# Patient Record
Sex: FEMALE | URBAN - METROPOLITAN AREA
[De-identification: names, ages, dates, MRNs, and addresses within clinical notes are randomized per-mention and may not be internally consistent; named-entity substitution may affect disease eponyms.]

---

## 2020-07-25 ENCOUNTER — TELEPHONE ENCOUNTER (OUTPATIENT)
Dept: URBAN - METROPOLITAN AREA CLINIC 13 | Facility: CLINIC | Age: 70
End: 2020-07-25

## 2020-07-26 ENCOUNTER — TELEPHONE ENCOUNTER (OUTPATIENT)
Dept: URBAN - METROPOLITAN AREA CLINIC 13 | Facility: CLINIC | Age: 70
End: 2020-07-26

## 2021-01-05 ENCOUNTER — HOSPITAL ENCOUNTER (OUTPATIENT)
Age: 71
Discharge: HOME | End: 2021-01-07
Payer: MEDICARE

## 2021-01-05 ENCOUNTER — HOSPITAL ENCOUNTER (OUTPATIENT)
Dept: INTERVENTIONAL RADIOLOGY/VASCULAR | Age: 71
Discharge: HOME | End: 2021-01-07
Payer: MEDICARE

## 2021-01-05 DIAGNOSIS — Z01.818 PREOP TESTING: ICD-10-CM

## 2021-01-05 PROCEDURE — U0003 INFECTIOUS AGENT DETECTION BY NUCLEIC ACID (DNA OR RNA); SEVERE ACUTE RESPIRATORY SYNDROME CORONAVIRUS 2 (SARS-COV-2) (CORONAVIRUS DISEASE [COVID-19]), AMPLIFIED PROBE TECHNIQUE, MAKING USE OF HIGH THROUGHPUT TECHNOLOGIES AS DESCRIBED BY CMS-2020-01-R: HCPCS

## 2021-01-05 PROCEDURE — 93922 UPR/L XTREMITY ART 2 LEVELS: CPT

## 2021-01-05 NOTE — PROGRESS NOTES
Have you been tested for COVID  Yes           Have you been told you were positive for COVID No  Have you had any known exposure to someone that is positive for COVID No  Do you have a cough                   No              Do you have shortness of breath No                 Do you have a sore throat            No                Are you having chills                    No                Are you having muscle aches. No                    Please come to the hospital wearing a mask and have your significant other wear a mask as well. Both of you should check your temperature before leaving to come here,  if it is 100 or higher please call [phone] for instruction. [facility] PRE-ADMISSION TESTING INSTRUCTIONS    The Preadmission Testing patient is instructed accordingly using the following criteria (check applicable):    ARRIVAL INSTRUCTIONS:  [x] Parking the day of Surgery is located in the Main Entrance lot. Upon entering the door, make an immediate right to the surgery reception desk    [x] Bring photo ID and insurance card    [] Bring in a copy of Living will or Durable Power of  papers.     [x] Please be sure to arrange for responsible adult to provide transportation to and from the hospital    [x] Please arrange for responsible adult to be with you for the 24 hour period post procedure due to having anesthesia      GENERAL INSTRUCTIONS:    [x] Nothing by mouth after midnight, including gum, candy, mints or water    [x] You may brush your teeth, but do not swallow any water    [] Take medications as instructed with 1-2 oz of water    [x] Stop herbal supplements and vitamins 5 days prior to procedure    [] Follow preop dosing of blood thinners per physician instructions    [] Take 1/2 dose of evening insulin, but no insulin after midnight    [x] No oral diabetic medications after midnight [x] If diabetic and have low blood sugar or feel symptomatic, take 1-2oz apple juice only    [] Bring inhalers day of surgery    [] Bring C-PAP/ Bi-Pap day of surgery    [] Bring urine specimen day of surgery    [x] Shower or bath with soap, lather and rinse well, AM of Surgery, no lotion, powders or creams to surgical site    [] Follow bowel prep as instructed per surgeon    [x] No tobacco products within 24 hours of surgery     [x] No alcohol or illegal drug use within 24 hours of surgery.     [x] Jewelry, body piercing's, eyeglasses, contact lenses and dentures are not permitted into surgery (bring cases)      [x] Please do not wear any nail polish, make up or hair products on the day of surgery    [x] You can expect a call the business day prior to procedure to notify you if your arrival time changes    [x] If you receive a survey after surgery we would greatly appreciate your comments    [] Parent/guardian of a minor must accompany their child and remain on the premises  the entire time they are under our care     [] Pediatric patients may bring favorite toy, blanket or comfort item with them    [] A caregiver or family member must remain with the patient during their stay if they are mentally handicapped, have dementia, disoriented or unable to use a call light or would be a safety concern if left unattended    [x] Please notify surgeon if you develop any illness between now and time of surgery (cold, cough, sore throat, fever, nausea, vomiting) or any signs of infections  including skin, wounds, and dental.    [x]  The Outpatient Pharmacy is available to fill your prescription here on your day of surgery, ask your preop nurse for details    [x] Other instructions: Wear comfortable clothing    EDUCATIONAL MATERIALS PROVIDED:    [] PAT Preoperative Education Packet/Booklet     [] Medication List    [] Transfusion bracelet applied with instructions [] Shower with soap, lather and rinse well, and use CHG wipes provided the evening before surgery as instructed    [] Incentive spirometer with instructions

## 2021-01-07 LAB
SARS-COV-2: NOT DETECTED
SOURCE: NORMAL

## 2021-01-10 ENCOUNTER — ANESTHESIA EVENT (OUTPATIENT)
Dept: OPERATING ROOM | Age: 71
End: 2021-01-10
Payer: MEDICARE

## 2021-01-10 NOTE — ANESTHESIA PRE PROCEDURE
Department of Anesthesiology  Preprocedure Note       Name:  Enrico Toth   Age:  65 y.o.  :  1950                                          MRN:  [id number]         Date:  1/10/2021      Surgeon: Chad Wells):  Nicolas Garcia DPM    Procedure: Procedure(s):  HAMMER TOE CORRECTION 2 AND 3 ON THE LEFT LAPIDUS BUNIONECTOMY MIDFOOT FUSION  ENDOSCOPIC GASTROCNEMIUS RECESSION  HARVEST BONE GRAFT   +++IN 2 BONES+++    Medications prior to admission:   Prior to Admission medications    Medication Sig Start Date End Date Taking? Authorizing Provider   losartan (COZAAR) 100 MG tablet Take 100 mg by mouth daily   Yes Historical Provider, MD   metFORMIN (GLUCOPHAGE) 500 MG tablet Take 500 mg by mouth daily (with breakfast)   Yes Historical Provider, MD   montelukast (SINGULAIR) 5 MG chewable tablet Take 5 mg by mouth nightly   Yes Historical Provider, MD   DULoxetine (CYMBALTA) 60 MG extended release capsule Take 60 mg by mouth nightly   Yes Historical Provider, MD   Ergocalciferol (VITAMIN D2 PO) Take by mouth Twice a Week    Historical Provider, MD   Omega-3 Fatty Acids (OMEGA 3 PO) Take by mouth Instructed to hold 5 days pre-op    Historical Provider, MD   BIOTIN PO Take by mouth daily Instructed to hold 5 days pre-op    Historical Provider, MD       Current medications:    No current facility-administered medications for this encounter.       Current Outpatient Medications   Medication Sig Dispense Refill    losartan (COZAAR) 100 MG tablet Take 100 mg by mouth daily      metFORMIN (GLUCOPHAGE) 500 MG tablet Take 500 mg by mouth daily (with breakfast)      montelukast (SINGULAIR) 5 MG chewable tablet Take 5 mg by mouth nightly      DULoxetine (CYMBALTA) 60 MG extended release capsule Take 60 mg by mouth nightly      Ergocalciferol (VITAMIN D2 PO) Take by mouth Twice a Week      Omega-3 Fatty Acids (OMEGA 3 PO) Take by mouth Instructed to hold 5 days pre-op      BIOTIN PO Take by mouth daily Instructed to Evaluation  Patient summary reviewed no history of anesthetic complications:   Airway: Mallampati: III  TM distance: >3 FB   Neck ROM: full  Mouth opening: > = 3 FB Dental:          Pulmonary:Negative Pulmonary ROS breath sounds clear to auscultation  (+) sleep apnea: on CPAP,      (-) not a current smoker                           Cardiovascular:    (+) hypertension:,       ECG reviewed  Rhythm: regular  Rate: normal                    Neuro/Psych:   (+) neuromuscular disease (Fibromyalgia):,              ROS comment: S/P back surgery GI/Hepatic/Renal: Neg GI/Hepatic/Renal ROS            Endo/Other:    (+) DiabetesType II DM, , .                 Abdominal:           Vascular: negative vascular ROS. Anesthesia Plan      general     ASA 3     (Pt agrees to GA--IV induction and ETTube.)  Induction: intravenous. Anesthetic plan and risks discussed with patient. Plan discussed with CRNA. Attending anesthesiologist reviewed and agrees with Pre Eval content      DOS STAFF ADDENDUM:    Pt seen and examined, physical exam updated, chart reviewed including anesthesia, drug and allergy history. H&P reviewed. No interval changes to history or physical examination (unless noted above). NPO status confirmed. Anesthetic plan, risks, benefits, alternatives discussed with patient. Patient verbalized an understanding and agrees to proceed.      Jesenia Iqbal MD  Staff Anesthesiologist  7:31 AM          Jesenia Iqbal MD   1/10/2021

## 2021-01-11 ENCOUNTER — HOSPITAL ENCOUNTER (OUTPATIENT)
Age: 71
Setting detail: OUTPATIENT SURGERY
Discharge: HOME | End: 2021-01-11
Attending: PODIATRIST | Admitting: PODIATRIST
Payer: MEDICARE

## 2021-01-11 ENCOUNTER — ANESTHESIA (OUTPATIENT)
Dept: OPERATING ROOM | Age: 71
End: 2021-01-11
Payer: MEDICARE

## 2021-01-11 ENCOUNTER — APPOINTMENT (OUTPATIENT)
Dept: GENERAL RADIOLOGY | Age: 71
End: 2021-01-11
Attending: PODIATRIST
Payer: MEDICARE

## 2021-01-11 VITALS
BODY MASS INDEX: 39.65 KG/M2 | DIASTOLIC BLOOD PRESSURE: 77 MMHG | OXYGEN SATURATION: 93 % | HEART RATE: 92 BPM | SYSTOLIC BLOOD PRESSURE: 167 MMHG | WEIGHT: 210 LBS | HEIGHT: 61 IN | RESPIRATION RATE: 20 BRPM | TEMPERATURE: 97.3 F

## 2021-01-11 VITALS — TEMPERATURE: 97.6 F | DIASTOLIC BLOOD PRESSURE: 83 MMHG | OXYGEN SATURATION: 99 % | SYSTOLIC BLOOD PRESSURE: 163 MMHG

## 2021-01-11 DIAGNOSIS — G89.18 POST-OP PAIN: ICD-10-CM

## 2021-01-11 DIAGNOSIS — Z01.818 PREOP TESTING: Primary | ICD-10-CM

## 2021-01-11 LAB
METER GLUCOSE: 117 MG/DL (ref 74–99)
METER GLUCOSE: 130 MG/DL (ref 74–99)

## 2021-01-11 PROCEDURE — 87206 SMEAR FLUORESCENT/ACID STAI: CPT

## 2021-01-11 PROCEDURE — 2720000001 HC MISC SURG SUPPLY STERILE $51-500: Performed by: PODIATRIST

## 2021-01-11 PROCEDURE — 87075 CULTR BACTERIA EXCEPT BLOOD: CPT

## 2021-01-11 PROCEDURE — 87102 FUNGUS ISOLATION CULTURE: CPT

## 2021-01-11 PROCEDURE — 87015 SPECIMEN INFECT AGNT CONCNTJ: CPT

## 2021-01-11 PROCEDURE — 2709999900 HC NON-CHARGEABLE SUPPLY: Performed by: PODIATRIST

## 2021-01-11 PROCEDURE — 6370000000 HC RX 637 (ALT 250 FOR IP): Performed by: ANESTHESIOLOGY

## 2021-01-11 PROCEDURE — 7100000011 HC PHASE II RECOVERY - ADDTL 15 MIN: Performed by: PODIATRIST

## 2021-01-11 PROCEDURE — 6360000002 HC RX W HCPCS: Performed by: STUDENT IN AN ORGANIZED HEALTH CARE EDUCATION/TRAINING PROGRAM

## 2021-01-11 PROCEDURE — 3700000001 HC ADD 15 MINUTES (ANESTHESIA): Performed by: PODIATRIST

## 2021-01-11 PROCEDURE — 88311 DECALCIFY TISSUE: CPT

## 2021-01-11 PROCEDURE — 3700000000 HC ANESTHESIA ATTENDED CARE: Performed by: PODIATRIST

## 2021-01-11 PROCEDURE — 2500000003 HC RX 250 WO HCPCS: Performed by: PODIATRIST

## 2021-01-11 PROCEDURE — 2720000010 HC SURG SUPPLY STERILE: Performed by: PODIATRIST

## 2021-01-11 PROCEDURE — 2580000003 HC RX 258: Performed by: NURSE ANESTHETIST, CERTIFIED REGISTERED

## 2021-01-11 PROCEDURE — 87116 MYCOBACTERIA CULTURE: CPT

## 2021-01-11 PROCEDURE — 6360000002 HC RX W HCPCS: Performed by: ANESTHESIOLOGY

## 2021-01-11 PROCEDURE — 6360000002 HC RX W HCPCS: Performed by: NURSE ANESTHETIST, CERTIFIED REGISTERED

## 2021-01-11 PROCEDURE — 87205 SMEAR GRAM STAIN: CPT

## 2021-01-11 PROCEDURE — 3209999900 FLUORO FOR SURGICAL PROCEDURES

## 2021-01-11 PROCEDURE — 7100000010 HC PHASE II RECOVERY - FIRST 15 MIN: Performed by: PODIATRIST

## 2021-01-11 PROCEDURE — 2500000003 HC RX 250 WO HCPCS: Performed by: ANESTHESIOLOGY

## 2021-01-11 PROCEDURE — 88304 TISSUE EXAM BY PATHOLOGIST: CPT

## 2021-01-11 PROCEDURE — 3600000003 HC SURGERY LEVEL 3 BASE: Performed by: PODIATRIST

## 2021-01-11 PROCEDURE — 2500000003 HC RX 250 WO HCPCS: Performed by: NURSE ANESTHETIST, CERTIFIED REGISTERED

## 2021-01-11 PROCEDURE — 7100000001 HC PACU RECOVERY - ADDTL 15 MIN: Performed by: PODIATRIST

## 2021-01-11 PROCEDURE — 82962 GLUCOSE BLOOD TEST: CPT

## 2021-01-11 PROCEDURE — 3600000013 HC SURGERY LEVEL 3 ADDTL 15MIN: Performed by: PODIATRIST

## 2021-01-11 PROCEDURE — 87070 CULTURE OTHR SPECIMN AEROBIC: CPT

## 2021-01-11 PROCEDURE — C1713 ANCHOR/SCREW BN/BN,TIS/BN: HCPCS | Performed by: PODIATRIST

## 2021-01-11 PROCEDURE — 7100000000 HC PACU RECOVERY - FIRST 15 MIN: Performed by: PODIATRIST

## 2021-01-11 DEVICE — IMPLANTABLE DEVICE: Type: IMPLANTABLE DEVICE | Site: FOOT | Status: FUNCTIONAL

## 2021-01-11 RX ORDER — DEXAMETHASONE SODIUM PHOSPHATE 4 MG/ML
INJECTION, SOLUTION INTRA-ARTICULAR; INTRALESIONAL; INTRAMUSCULAR; INTRAVENOUS; SOFT TISSUE PRN
Status: DISCONTINUED | OUTPATIENT
Start: 2021-01-11 | End: 2021-01-11 | Stop reason: SDUPTHER

## 2021-01-11 RX ORDER — GLYCOPYRROLATE 1 MG/5 ML
SYRINGE (ML) INTRAVENOUS PRN
Status: DISCONTINUED | OUTPATIENT
Start: 2021-01-11 | End: 2021-01-11 | Stop reason: SDUPTHER

## 2021-01-11 RX ORDER — MIDAZOLAM HYDROCHLORIDE 1 MG/ML
INJECTION INTRAMUSCULAR; INTRAVENOUS PRN
Status: DISCONTINUED | OUTPATIENT
Start: 2021-01-11 | End: 2021-01-11 | Stop reason: SDUPTHER

## 2021-01-11 RX ORDER — RIVAROXABAN 10 MG/1
10 TABLET, FILM COATED ORAL
Qty: 30 TABLET | Refills: 1 | Status: SHIPPED | OUTPATIENT
Start: 2021-01-11 | End: 2021-06-30 | Stop reason: ALTCHOICE

## 2021-01-11 RX ORDER — LIDOCAINE HYDROCHLORIDE 20 MG/ML
INJECTION, SOLUTION EPIDURAL; INFILTRATION; INTRACAUDAL; PERINEURAL PRN
Status: DISCONTINUED | OUTPATIENT
Start: 2021-01-11 | End: 2021-01-11 | Stop reason: SDUPTHER

## 2021-01-11 RX ORDER — LABETALOL HYDROCHLORIDE 5 MG/ML
INJECTION, SOLUTION INTRAVENOUS PRN
Status: DISCONTINUED | OUTPATIENT
Start: 2021-01-11 | End: 2021-01-11 | Stop reason: SDUPTHER

## 2021-01-11 RX ORDER — PROPOFOL 10 MG/ML
INJECTION, EMULSION INTRAVENOUS PRN
Status: DISCONTINUED | OUTPATIENT
Start: 2021-01-11 | End: 2021-01-11 | Stop reason: SDUPTHER

## 2021-01-11 RX ORDER — FENTANYL CITRATE 50 UG/ML
INJECTION, SOLUTION INTRAMUSCULAR; INTRAVENOUS PRN
Status: DISCONTINUED | OUTPATIENT
Start: 2021-01-11 | End: 2021-01-11 | Stop reason: SDUPTHER

## 2021-01-11 RX ORDER — FENTANYL CITRATE 50 UG/ML
25 INJECTION, SOLUTION INTRAMUSCULAR; INTRAVENOUS EVERY 5 MIN PRN
Status: DISCONTINUED | OUTPATIENT
Start: 2021-01-11 | End: 2021-01-11 | Stop reason: HOSPADM

## 2021-01-11 RX ORDER — BUPIVACAINE HYDROCHLORIDE 5 MG/ML
INJECTION, SOLUTION EPIDURAL; INTRACAUDAL PRN
Status: DISCONTINUED | OUTPATIENT
Start: 2021-01-11 | End: 2021-01-11 | Stop reason: ALTCHOICE

## 2021-01-11 RX ORDER — TRAMADOL HYDROCHLORIDE 50 MG/1
50 TABLET ORAL
Status: COMPLETED | OUTPATIENT
Start: 2021-01-11 | End: 2021-01-11

## 2021-01-11 RX ORDER — KETOROLAC TROMETHAMINE 30 MG/ML
INJECTION, SOLUTION INTRAMUSCULAR; INTRAVENOUS PRN
Status: DISCONTINUED | OUTPATIENT
Start: 2021-01-11 | End: 2021-01-11 | Stop reason: SDUPTHER

## 2021-01-11 RX ORDER — LABETALOL HYDROCHLORIDE 5 MG/ML
5 INJECTION, SOLUTION INTRAVENOUS ONCE
Status: COMPLETED | OUTPATIENT
Start: 2021-01-11 | End: 2021-01-11

## 2021-01-11 RX ORDER — NEOSTIGMINE METHYLSULFATE 1 MG/ML
INJECTION, SOLUTION INTRAVENOUS PRN
Status: DISCONTINUED | OUTPATIENT
Start: 2021-01-11 | End: 2021-01-11 | Stop reason: SDUPTHER

## 2021-01-11 RX ORDER — TRAMADOL HYDROCHLORIDE 50 MG/1
50 TABLET ORAL EVERY 6 HOURS PRN
Qty: 28 TABLET | Refills: 0 | Status: SHIPPED | OUTPATIENT
Start: 2021-01-11 | End: 2021-01-18

## 2021-01-11 RX ORDER — SODIUM CHLORIDE 9 MG/ML
INJECTION, SOLUTION INTRAVENOUS CONTINUOUS PRN
Status: DISCONTINUED | OUTPATIENT
Start: 2021-01-11 | End: 2021-01-11 | Stop reason: SDUPTHER

## 2021-01-11 RX ORDER — DOXYCYCLINE HYCLATE 100 MG/1
100 CAPSULE ORAL 2 TIMES DAILY
Qty: 20 CAPSULE | Refills: 0 | Status: SHIPPED | OUTPATIENT
Start: 2021-01-11 | End: 2021-01-21

## 2021-01-11 RX ORDER — ROCURONIUM BROMIDE 10 MG/ML
INJECTION, SOLUTION INTRAVENOUS PRN
Status: DISCONTINUED | OUTPATIENT
Start: 2021-01-11 | End: 2021-01-11 | Stop reason: SDUPTHER

## 2021-01-11 RX ORDER — ONDANSETRON 2 MG/ML
INJECTION INTRAMUSCULAR; INTRAVENOUS PRN
Status: DISCONTINUED | OUTPATIENT
Start: 2021-01-11 | End: 2021-01-11 | Stop reason: SDUPTHER

## 2021-01-11 RX ADMIN — LABETALOL HYDROCHLORIDE 2.5 MG: 5 INJECTION INTRAVENOUS at 08:07

## 2021-01-11 RX ADMIN — KETOROLAC TROMETHAMINE 30 MG: 30 INJECTION, SOLUTION INTRAMUSCULAR; INTRAVENOUS at 10:05

## 2021-01-11 RX ADMIN — PROPOFOL 150 MG: 10 INJECTION, EMULSION INTRAVENOUS at 07:42

## 2021-01-11 RX ADMIN — LIDOCAINE HYDROCHLORIDE 60 MG: 20 INJECTION, SOLUTION EPIDURAL; INFILTRATION; INTRACAUDAL; PERINEURAL at 07:42

## 2021-01-11 RX ADMIN — LABETALOL HYDROCHLORIDE 5 MG: 5 INJECTION INTRAVENOUS at 08:59

## 2021-01-11 RX ADMIN — FENTANYL CITRATE 50 MCG: 50 INJECTION, SOLUTION INTRAMUSCULAR; INTRAVENOUS at 07:33

## 2021-01-11 RX ADMIN — ROCURONIUM BROMIDE 10 MG: 10 INJECTION, SOLUTION INTRAVENOUS at 09:29

## 2021-01-11 RX ADMIN — Medication 2 G: at 07:31

## 2021-01-11 RX ADMIN — PROPOFOL 50 MG: 10 INJECTION, EMULSION INTRAVENOUS at 07:47

## 2021-01-11 RX ADMIN — SODIUM CHLORIDE: 9 INJECTION, SOLUTION INTRAVENOUS at 07:30

## 2021-01-11 RX ADMIN — FENTANYL CITRATE 25 MCG: 50 INJECTION, SOLUTION INTRAMUSCULAR; INTRAVENOUS at 11:20

## 2021-01-11 RX ADMIN — Medication 3 MG: at 10:16

## 2021-01-11 RX ADMIN — TRAMADOL HYDROCHLORIDE 50 MG: 50 TABLET, FILM COATED ORAL at 12:52

## 2021-01-11 RX ADMIN — FENTANYL CITRATE 25 MCG: 50 INJECTION, SOLUTION INTRAMUSCULAR; INTRAVENOUS at 11:25

## 2021-01-11 RX ADMIN — FENTANYL CITRATE 50 MCG: 50 INJECTION, SOLUTION INTRAMUSCULAR; INTRAVENOUS at 07:53

## 2021-01-11 RX ADMIN — ROCURONIUM BROMIDE 10 MG: 10 INJECTION, SOLUTION INTRAVENOUS at 08:01

## 2021-01-11 RX ADMIN — MIDAZOLAM 1 MG: 1 INJECTION INTRAMUSCULAR; INTRAVENOUS at 07:36

## 2021-01-11 RX ADMIN — LABETALOL HYDROCHLORIDE 2.5 MG: 5 INJECTION INTRAVENOUS at 09:46

## 2021-01-11 RX ADMIN — LABETALOL HYDROCHLORIDE 5 MG: 5 INJECTION INTRAVENOUS at 08:21

## 2021-01-11 RX ADMIN — FENTANYL CITRATE 100 MCG: 50 INJECTION, SOLUTION INTRAMUSCULAR; INTRAVENOUS at 08:03

## 2021-01-11 RX ADMIN — SODIUM CHLORIDE: 9 INJECTION, SOLUTION INTRAVENOUS at 09:26

## 2021-01-11 RX ADMIN — MIDAZOLAM 1 MG: 1 INJECTION INTRAMUSCULAR; INTRAVENOUS at 07:32

## 2021-01-11 RX ADMIN — LABETALOL HYDROCHLORIDE 5 MG: 5 INJECTION INTRAVENOUS at 13:53

## 2021-01-11 RX ADMIN — DEXAMETHASONE SODIUM PHOSPHATE 10 MG: 4 INJECTION, SOLUTION INTRAMUSCULAR; INTRAVENOUS at 09:57

## 2021-01-11 RX ADMIN — ROCURONIUM BROMIDE 40 MG: 10 INJECTION, SOLUTION INTRAVENOUS at 07:42

## 2021-01-11 RX ADMIN — Medication 0.6 MG: at 10:14

## 2021-01-11 RX ADMIN — ONDANSETRON 4 MG: 2 INJECTION INTRAMUSCULAR; INTRAVENOUS at 10:07

## 2021-01-11 ASSESSMENT — PULMONARY FUNCTION TESTS
PIF_VALUE: 33
PIF_VALUE: 37
PIF_VALUE: 32
PIF_VALUE: 7
PIF_VALUE: 4
PIF_VALUE: 32
PIF_VALUE: 30
PIF_VALUE: 38
PIF_VALUE: 29
PIF_VALUE: 30
PIF_VALUE: 15
PIF_VALUE: 30
PIF_VALUE: 5
PIF_VALUE: 35
PIF_VALUE: 36
PIF_VALUE: 32
PIF_VALUE: 36
PIF_VALUE: 36
PIF_VALUE: 38
PIF_VALUE: 36
PIF_VALUE: 35
PIF_VALUE: 30
PIF_VALUE: 32
PIF_VALUE: 5
PIF_VALUE: 37
PIF_VALUE: 28
PIF_VALUE: 36
PIF_VALUE: 38
PIF_VALUE: 17
PIF_VALUE: 38
PIF_VALUE: 36
PIF_VALUE: 9
PIF_VALUE: 5
PIF_VALUE: 38
PIF_VALUE: 8
PIF_VALUE: 8
PIF_VALUE: 6
PIF_VALUE: 36
PIF_VALUE: 36
PIF_VALUE: 28
PIF_VALUE: 37
PIF_VALUE: 35
PIF_VALUE: 36
PIF_VALUE: 38
PIF_VALUE: 37
PIF_VALUE: 16
PIF_VALUE: 8
PIF_VALUE: 29
PIF_VALUE: 30
PIF_VALUE: 36
PIF_VALUE: 37
PIF_VALUE: 32
PIF_VALUE: 15
PIF_VALUE: 37
PIF_VALUE: 36
PIF_VALUE: 35
PIF_VALUE: 28
PIF_VALUE: 30
PIF_VALUE: 30
PIF_VALUE: 36
PIF_VALUE: 32
PIF_VALUE: 3
PIF_VALUE: 38
PIF_VALUE: 36
PIF_VALUE: 36
PIF_VALUE: 35
PIF_VALUE: 36
PIF_VALUE: 38
PIF_VALUE: 36
PIF_VALUE: 39
PIF_VALUE: 36
PIF_VALUE: 5
PIF_VALUE: 38
PIF_VALUE: 31
PIF_VALUE: 6
PIF_VALUE: 36
PIF_VALUE: 30
PIF_VALUE: 36
PIF_VALUE: 37
PIF_VALUE: 38
PIF_VALUE: 37
PIF_VALUE: 36

## 2021-01-11 ASSESSMENT — PAIN DESCRIPTION - PAIN TYPE
TYPE: SURGICAL PAIN

## 2021-01-11 ASSESSMENT — LIFESTYLE VARIABLES: SMOKING_STATUS: 0

## 2021-01-11 ASSESSMENT — PAIN DESCRIPTION - LOCATION
LOCATION: FOOT

## 2021-01-11 ASSESSMENT — PAIN DESCRIPTION - DESCRIPTORS
DESCRIPTORS: ACHING;DISCOMFORT
DESCRIPTORS: SORE
DESCRIPTORS: ACHING;SORE
DESCRIPTORS: ACHING;SORE
DESCRIPTORS: SORE

## 2021-01-11 ASSESSMENT — PAIN - FUNCTIONAL ASSESSMENT: PAIN_FUNCTIONAL_ASSESSMENT: 0-10

## 2021-01-11 ASSESSMENT — PAIN SCALES - GENERAL
PAINLEVEL_OUTOF10: 5
PAINLEVEL_OUTOF10: 5
PAINLEVEL_OUTOF10: 6
PAINLEVEL_OUTOF10: 8
PAINLEVEL_OUTOF10: 7
PAINLEVEL_OUTOF10: 5
PAINLEVEL_OUTOF10: 8

## 2021-01-11 ASSESSMENT — PAIN DESCRIPTION - ORIENTATION
ORIENTATION: LEFT

## 2021-01-11 NOTE — PROGRESS NOTES
Prior to discharge ortho boot reapplied, Rx given to patient for walker. When patient taken to POV, additional instructions given to daughter, regarding Rx's.   Daughter verbalizes understanding

## 2021-01-11 NOTE — ANESTHESIA POSTPROCEDURE EVALUATION
Department of Anesthesiology  Postprocedure Note    Patient: Joelle Smyth  MRN: [id number]  YOB: 1950  Date of evaluation: 1/11/2021  Time:  1:06 PM     Procedure Summary     Date: 01/11/21 Room / Location: [facility] OR [address] [place] [address]    Anesthesia Start: 0730 Anesthesia Stop:     Procedures:       LEFT LAPIDUS BUNIONECTOMY,  MIDFOOT FUSION, EXTENSOR TENDON TRANSFER SECOND AND THIRD, REDUCTION OF SECOND METATARSAL JOINT, FLEXOR TENDON TRANSFER 2 & 3, CAPSULOTOMY 2 & 3 LEFT FOOT (Left Foot)      ENDOSCOPIC GASTROCNEMIUS RECESSION,  HARVEST BONE GRAFT (Left Foot) Diagnosis: (HAMMER TOE PES VALGUS EQUINUS)    Surgeons: Carlito Marc DPM Responsible Provider: Anastasiya Grijalva MD    Anesthesia Type: general ASA Status: 3          Anesthesia Type: general    Fredo Phase I: Fredo Score: 10    Fredo Phase II: Fredo Score: 10    Last vitals: Reviewed and per EMR flowsheets.        Anesthesia Post Evaluation    Patient location during evaluation: PACU  Level of consciousness: awake  Airway patency: patent  Nausea & Vomiting: no nausea and no vomiting  Complications: no  Cardiovascular status: hemodynamically stable  Respiratory status: acceptable

## 2021-01-11 NOTE — PROGRESS NOTES
Pt on and off bedpan , given nourishment at this time  1255 pt medicated for pain see mar   1335 pt assisted on and off bedpan at this time tolerating po b/p elevated pt asking for more pain medication, explain to pt will chec with   [phone] spoke with dr Marianne Landin updated on above labetelol ordered see mar  1400 pt b/p slightly improved pt asking to go on bedpan at this time  1425 pt states pain improving other then worsens when moves , assisted off of bedpan at this time    handoff of care report given to che robles

## 2021-01-11 NOTE — BRIEF OP NOTE
COLINK AFX LOCKING SCREW, 3.5X16MM Screw/Plate/Nail/Dariel   [employer]-PMM [id number] Left 1 Implanted   COLINK AFX LOCKING SCREW, 3.5X20MM Screw/Plate/Nail/Dariel   [id number] [facility] [id number] Left 1 Implanted   K WIRE FIX L285MM DIA2MM S STL TRCR PNT  K WIRE FIX L285MM DIA2MM S STL TRCR PNT  [employer]-  Left 2 Implanted         Drains: * No LDAs found *    Findings: Good reduction of deformity and placement of hardware appreciated on x-ray. Toes pink with good cap refill time after correction and placement of k-wires.      Electronically signed by [facility] on 1/11/2021 at 10:49 AM

## 2021-01-12 LAB — GRAM STAIN ORDERABLE: NORMAL

## 2021-01-12 NOTE — OP NOTE
[address] [address]                                OPERATIVE REPORT    PATIENT NAME: Yvette Hodges                   :        1950  MED REC NO:   [id number]                            ROOM:  ACCOUNT NO:   [de-identified]                           ADMIT DATE: 2021  PROVIDER:     Migdalia Carlos DPM    DATE OF PROCEDURE:  2021    INDICATION NOTE:  The patient is a 75-year-old white female who is seen  for painful bunion, but more importantly a sub-second metatarsal pain,  left. The patient is aware of pros, cons, risks, benefits,  overcorrection, undercorrection, recurrence, numbness, infection,  nonunion, delayed union, malunion, DVT, PE, limb loss, anything can  happen, nothing should happen. With this in mind, she agreed with  consent, site marking, perioperative management. Her daughter has been  involved with the preoperative discussions and surgery. Again, they  understand the pros, cons, risks, and benefits. She agreed to site  marking, perioperative management, and consent. LOWER EXTREMITY PHYSICAL EXAMINATION:  VASCULAR:  Intact pedal pulses, 2/4 DP, PT bilaterally. Good capillary  refill time. NEUROLOGICAL:  Decreased epicritic sensation. DERMATOLOGICAL:  She has a sub-2 hyperkeratosis. She has inflamed  erythematous preulcer lesion, inflamed irritation of the PIPJ, second  toe, left and to a lesser extent, to the third toe as well. There is a  mildly inflamed erythematous first MTP of the left. MUSCULOSKELETAL:  She has a gastroc equinus, ankle joint contracture,  left. She has gross instability of her medial column. She has a  hypermobile first ray. She has instability between one and second  metatarsal in the midfoot of the right. She has a flexion contracture  at PIPJ 2, 3, left.   She has extensor contracture 2, 3 metatarsophalangeal joint, left. She has a subluxation and dislocation  of the second and third metatarsophalangeal joint, more so on second,  left. ORTHOPEDIC:  HAV deformity, contracted hammertoe deformities, 2, 3, left  with dislocation and subluxation of the second metatarsophalangeal joint  and extension of the digit on the second metatarsal and third  metatarsal, left foot. SURGEON:  Jimbo Lei DPM.    ASSISTANTS:  1.  Dr. Sebastien Foote, fellow. 2.  Sommer Ramirez, PGY-3.  [address], PGY-2.    PREOPERATIVE DIAGNOSES:  1. Gastroc equinus. 2.  Hallux valgus. 3.  Midfoot arthritis. 4.  Dislocation and subluxation of the second and third  metatarsophalangeal joint. 5.  Joint contracture and extension at the metatarsophalangeal joint 2.  6.  Joint contracture and extension at the metatarsophalangeal joint 3.  7.  Flexion contracture of 2 at the PIPJ. 8.  Flexion contracture at the PIPJ 3, all left. POSTOPERATIVE DIAGNOSES:  1. Gastroc equinus. 2.  Hallux valgus. 3.  Midfoot arthritis. 4.  Dislocation and subluxation of the second and third  metatarsophalangeal joint. 5.  Joint contracture and extension at the metatarsophalangeal joint 2.  6.  Joint contracture and extension at the metatarsophalangeal joint 3.  7.  Flexion contracture of 2 at the PIPJ. 8.  Flexion contracture at the PIPJ 3, all left. PROCEDURES PERFORMED:  1. Gastroc recession performed endoscopically. 2.  Lapidus bunionectomy. 3.  Midfoot fusion. 4.  Harvesting calcaneal graft. 5.  Reduction of second metatarsophalangeal joint dislocation. 6.  Capsulotomy of the second MTP.  7.  Capsulotomy of the third MTP. 8.  Tendon transfer, EDB to EDL 2.  9.  Tendon transfer, EDB to EDL 3.  10. Tendon transfer, FDL to the extensor nguyen 2.  11. Flexor transfer, FDL 3 to the extensor nguyen. PROCEDURE IN DETAIL:  The patient was seen in the preop holding area. Appropriate site marking was performed. She agreed to site marking,  perioperative management. She was brought to the OR and placed well  padded on the OR table, where anesthesia was achieved. Once the  anesthesia was achieved, appropriate timeout was performed and everybody  in the room concurred. PROCEDURE #1:  ENDOSCOPIC GASTROC RECESSION. Attention was directed to  the posterior aspect of the left lower leg, where a small stab incision  was made of the aponeurosis in the posterior one-third of the leg. It  was deepened in the same plane using sharp and blunt dissection,  avoiding neurovascular structures, carried down to the deep fascial  tissue. Next, a fascial elevator was used. This was carried laterally  and next, an obturator was carried laterally. Stab incision was made  laterally and at this point in time, an obturator was punctured through  laterally and a cannula was inserted over from lateral to medial.  Next,  a 4.0 scope was inserted.  _____ scope was used and at this time, the  gastroc aponeurosis was identified. Only gastroc aponeurosis was  identified. There was no evidence of neurovascular structures, in  particular sural nerve. At this time, only with the gastroc aponeurosis  identified, a Poncho procedure was performed increasing range of motion  of the ankle joint. Equipments were removed. The skin was closed using  2-0 nylon. PROCEDURE #2:  LAPIDUS BUNIONECTOMY.      ext, attention was directed to  the TMT 1. Incision was deepened in the same plane using sharp and  blunt dissection, avoiding neurovascular structures, carried down to the  articular surface. At this time, significant amount of time was spent  taking down the joint, preparing for fusion with osteotomes, mallets,  curettes, high-speed burs, and drills.   Significant amount of time was  spent preparing the joint for fusion with good bleeding on the medial aspect of her second metatarsal, as well as the tarsometatarsal joint. This was temporarily fixated with 2.0 K-wires and checked under  fluoroscopy. PROCEDURE #3:  MIDFOOT ARTHRODESIS. At this time, there was significant  amount of arthritis down at the midfoot. Attention was directed to the  intercuneiform and the midfoot, where the dorsal spurring was  identified. The dorsal spurring was resected, and at this point in  time, the intercuneiform and midfoot fusion was taken place. The joint  was prepared for fusion. Significant amount of time was spent preparing  the joint with rongeurs, high-speed bur. PROCEDURE #4:  HARVESTING CALCANEAL GRAFT. Attention was directed to  the lateral aspect of the calcaneus inferior and posterior to sural  nerve and the peroneal tendon within the resting skin line. The skin  line was incised, and at this time, a Free elevator was used to create a  wall at the lateral aspect of the calcaneus. Next, the lateral wall of  the calcaneus was cleared off and a 2.5 drill was used to puncture the  lateral wall of the calcaneus, and next, a curette was used to harvest  bone graft. Bone was harvested and packed very tightly as a  shear-strain graft into the intercuneiform, the midfoot, and also the  tarsometatarsal joint. The skin and lateral wall of the calcaneus then  was closed using 2-0 nylon. Next, over the tarsometatarsal joint and  the midfoot, the deep tissues were closed using 0 Vicryl and the skin  was closed using 2-0 nylon. The fixation preceded closing the midfoot  fusion and the Lapidus, combination of a 3.5 [place] screw from into  bones and a 5-hole plate that was tailored to fit the tarsometatarsal  joint with a combination of locking and nonlocking screws. As I said,  the deep tissues were closed using 0 Vicryl and skin was closed using  2-0 nylon. PROCEDURE #5:  CAPSULOTOMY OF THE SECOND METATARSOPHALANGEAL JOINT. An incision was made over the second metatarsophalangeal joint. It was  deepened in the same plane using sharp and blunt dissection avoiding  neurovascular structures. Next, the EDL tendon was cut as far as  proximal.  The EDB was cut as far as distal.  Next, with a use of 15  blade, a capsulotomy was performed completely releasing the capsule of  the second metatarsophalangeal joint, in particular where the  contracture was. Next, Ajsiel Bel elevator was used to remove all the  fibrosis and a significant contracture identified. PROCEDURE #6:  REDUCTION OF THE SECOND METATARSOPHALANGEAL JOINT  DISLOCATION. Next, using a manual reduction, the second  metatarsophalangeal joint was manually reduced relocating the second MTP  into anatomic alignment. PROCEDURE #7:  THIRD METATARSOPHALANGEAL JOINT CAPSULOTOMY. Attention was directed to the third metatarsophalangeal joint. At this time,  using a 15 blade, a sharp dissection was performed freeing the capsular  tissue of the third metacarpophalangeal joint. Next, McGlamry elevator  was used freeing up all the soft tissues and releasing the third MTP of  the joint. PROCEDURE #8: Tendon transfer, EDB to EDL 2. Next, tendon transfer was performed of the EDB to EDL of  the second. Next, the EDB was freed and released and mobilized and EDL  was freed, released, and mobilized. A whip graft was inserted, the EDB  to the distal stump of EDL, with a whip graft under physiological  tension. This was sutured with 3-0 Monocryl. PROCEDURE #9:  EDB to EDL 3 TENDON TRANSFER. Next, attention was  directed to EDB. It was cut as far as distal.  The EDL was already cut  as far as proximal and at this time, the EDB was mobilized into the  distal stump of EDL, and at this point in time, with the whip graft,  under physiological tension, the EDB was transferred to the EDL 3.   This  was sutured and the deep tissues were closed using 3-0 Monocryl and skin was closed using 2-0 nylon. PROCEDURE #10:  TENDON TRANSFE, FDL TO THE EXTENSOR NGUYEN  2. Next, incision was made at the medial aspect of  the second toe, where a midline incision was made. It was deepened in  the same plane using sharp and blunt dissection, avoiding neurovascular  structures, carried down to the FDL tendon. It was detached from the  distal phalanx and traced proximally. Next, FDB, medial and lateral  slips were cut and released and a complete capsulotomy was performed at  the PIPJ reducing the deformity and 2.0 K-wires were inserted from  distal phalanx, middle phalanx, and proximal phalanx across the  metatarsophalangeal joint in an anatomic alignment holding the reduction  in alignment. The FDL then was transferred to the extensor nguyen under  physiological tension. The skin was closed using 2-0 nylon. PROCEDURE #11:  FDL TENDON TRANSFER TO THE EXTENSOR NGUYEN 3. Next, the incision was made at  the midline of the FDL 3 on the medial aspect of the second toe. It was  deepened in the same plane using sharp and blunt dissection avoiding  neurovascular structure, carried down to the FDL, where it was detached  from the distal phalanx and at this point in time, it was traced as far  as proximal.  FDB, medial and lateral slips were cut and at this time, a  complete capsulotomy was performed of the third PIPJ. Next, 2.0 K-wires  were inserted from the distal phalanx, middle phalanx, and proximal  phalanx across the metatarsophalangeal joint, holding the reduction in  anatomic alignment of the second and third toe respectively. Next, an  FDL tendon transfer was performed under physiological tension taking the  flexor digitorum longus to the extensor nguyen under physiological tension  and anchored subsequently with 3-0 Monocryl. The skin was closed using 2-0 nylon. The tourniquet was dropped at 2 hours.   She was anesthetized with 30 mL of 0.5% Marcaine about the surgical site. Surgical wounds  were dressed with Betadine-soaked Adaptic, 4x4s, Luke in a sterile  compressive fashion. She tolerated the procedure and anesthesia well,  left the OR with vital signs stable and neurovascular status intact. An estimation of less than 20 cc of blood was lost during the surgical procedures.       Keyur Sweeney DPM    D: 01/11/2021 11:01:20       T: 01/11/2021 13:07:04     [email or web address]  Job#: [id number]     Doc#: [id number]    CC:

## 2021-01-13 LAB — CULTURE SURGICAL: NORMAL

## 2021-01-16 LAB — ANAEROBIC CULTURE: NORMAL

## 2021-02-15 LAB
FUNGUS (MYCOLOGY) CULTURE: NORMAL
FUNGUS STAIN: NORMAL

## 2021-03-02 LAB
AFB CULTURE (MYCOBACTERIA): NORMAL
AFB SMEAR: NORMAL

## 2021-06-30 RX ORDER — ROSUVASTATIN CALCIUM 5 MG/1
5 TABLET, COATED ORAL NIGHTLY
COMMUNITY
Start: 2021-01-07

## 2021-06-30 RX ORDER — ACETAMINOPHEN 500 MG
500 TABLET ORAL 3 TIMES DAILY
COMMUNITY

## 2021-06-30 RX ORDER — MONTELUKAST SODIUM 5 MG/1
5 TABLET, CHEWABLE ORAL DAILY
COMMUNITY

## 2021-06-30 NOTE — PROGRESS NOTES
[facility] PRE-ADMISSION TESTING INSTRUCTIONS    The Preadmission Testing patient is instructed accordingly using the following criteria (check applicable):    ARRIVAL INSTRUCTIONS:  [x] Parking the day of Surgery is located in the Main Entrance lot. Upon entering the door, make an immediate right to the surgery reception desk    [x] Bring photo ID and insurance card    [] Bring in a copy of Living will or Durable Power of  papers. [x] Please be sure to arrange for responsible adult to provide transportation to and from the hospital    [x] Please arrange for responsible adult to be with you for the 24 hour period post procedure due to having anesthesia      GENERAL INSTRUCTIONS:    [x] Nothing by mouth after midnight, including gum, candy, mints or water    [x] You may brush your teeth, but do not swallow any water    [x] Take medications as instructed with 1-2 oz of water    [x] Stop herbal supplements and vitamins 5 days prior to procedure    [x] Follow preop dosing of blood thinners per physician instructions    [] Take 1/2 dose of evening insulin, but no insulin after midnight    [x] No oral diabetic medications after midnight    [x] If diabetic and have low blood sugar or feel symptomatic, take 1-2oz apple juice only    [] Bring inhalers day of surgery    [] Bring C-PAP/ Bi-Pap day of surgery    [] Bring urine specimen day of surgery    [x] Shower or bath with soap, lather and rinse well, AM of Surgery, no lotion, powders or creams to surgical site    [] Follow bowel prep as instructed per surgeon    [x] No tobacco products within 24 hours of surgery     [x] No alcohol or illegal drug use within 24 hours of surgery.     [x] Jewelry, body piercing's, eyeglasses, contact lenses and dentures are not permitted into surgery (bring cases)      [x] Please do not wear any nail polish, make up or hair products on the day of surgery    [x] You can expect a call the business day prior to procedure to notify you if your arrival time changes    [x] If you receive a survey after surgery we would greatly appreciate your comments    [] Parent/guardian of a minor must accompany their child and remain on the premises  the entire time they are under our care     [] Pediatric patients may bring favorite toy, blanket or comfort item with them    [] A caregiver or family member must remain with the patient during their stay if they are mentally handicapped, have dementia, disoriented or unable to use a call light or would be a safety concern if left unattended    [x] Please notify surgeon if you develop any illness between now and time of surgery (cold, cough, sore throat, fever, nausea, vomiting) or any signs of infections  including skin, wounds, and dental.    [x]  The Outpatient Pharmacy is available to fill your prescription here on your day of surgery, ask your preop nurse for details    [] Other instructions    EDUCATIONAL MATERIALS PROVIDED:    [] PAT Preoperative Education Packet/Booklet     [] Medication List    [] Transfusion bracelet applied with instructions    [] Shower with soap, lather and rinse well, and use CHG wipes provided the evening before surgery as instructed    [] Incentive spirometer with instructions

## 2021-06-30 NOTE — PROGRESS NOTES
Have you been tested for COVID  Yes     06/28/2021 to fax results      Have you been told you were positive for COVID No  Have you had any known exposure to someone that is positive for COVID No  Do you have a cough                   No              Do you have shortness of breath No                 Do you have a sore throat            No                Are you having chills                    No                Are you having muscle aches. No                    Please come to the hospital wearing a mask and have your significant other wear a mask as well. Both of you should check your temperature before leaving to come here,  if it is 100 or higher please call [phone] for instruction.

## 2021-06-30 NOTE — PROGRESS NOTES
Reviewed with Dr Latrice Reza, pt surgery and date, pt medical hx as documented CBC and CMP results of 06/17/21.  Order taken CBC and CMP acceptable for surgery 07/02/21 with Dr Cody Stovall.

## 2021-07-02 ENCOUNTER — ANESTHESIA EVENT (OUTPATIENT)
Dept: OPERATING ROOM | Age: 71
End: 2021-07-02
Payer: MEDICARE

## 2021-07-02 ENCOUNTER — HOSPITAL ENCOUNTER (OUTPATIENT)
Age: 71
Setting detail: OUTPATIENT SURGERY
Discharge: HOME | End: 2021-07-02
Attending: PODIATRIST | Admitting: PODIATRIST
Payer: MEDICARE

## 2021-07-02 ENCOUNTER — ANESTHESIA (OUTPATIENT)
Dept: OPERATING ROOM | Age: 71
End: 2021-07-02
Payer: MEDICARE

## 2021-07-02 ENCOUNTER — APPOINTMENT (OUTPATIENT)
Dept: GENERAL RADIOLOGY | Age: 71
End: 2021-07-02
Attending: PODIATRIST
Payer: MEDICARE

## 2021-07-02 VITALS
DIASTOLIC BLOOD PRESSURE: 76 MMHG | RESPIRATION RATE: 16 BRPM | BODY MASS INDEX: 41.35 KG/M2 | HEIGHT: 61 IN | HEART RATE: 72 BPM | SYSTOLIC BLOOD PRESSURE: 161 MMHG | OXYGEN SATURATION: 96 % | TEMPERATURE: 97.3 F | WEIGHT: 219 LBS

## 2021-07-02 VITALS
TEMPERATURE: 94.6 F | SYSTOLIC BLOOD PRESSURE: 196 MMHG | RESPIRATION RATE: 2 BRPM | DIASTOLIC BLOOD PRESSURE: 92 MMHG | OXYGEN SATURATION: 98 %

## 2021-07-02 DIAGNOSIS — G89.18 POST-OPERATIVE PAIN: Primary | ICD-10-CM

## 2021-07-02 LAB
METER GLUCOSE: 117 MG/DL (ref 74–99)
METER GLUCOSE: 123 MG/DL (ref 74–99)

## 2021-07-02 PROCEDURE — 2709999900 HC NON-CHARGEABLE SUPPLY: Performed by: PODIATRIST

## 2021-07-02 PROCEDURE — 3600000013 HC SURGERY LEVEL 3 ADDTL 15MIN: Performed by: PODIATRIST

## 2021-07-02 PROCEDURE — 2500000003 HC RX 250 WO HCPCS

## 2021-07-02 PROCEDURE — 3700000001 HC ADD 15 MINUTES (ANESTHESIA): Performed by: PODIATRIST

## 2021-07-02 PROCEDURE — 7100000000 HC PACU RECOVERY - FIRST 15 MIN: Performed by: PODIATRIST

## 2021-07-02 PROCEDURE — 6360000002 HC RX W HCPCS: Performed by: ANESTHESIOLOGY

## 2021-07-02 PROCEDURE — 7100000011 HC PHASE II RECOVERY - ADDTL 15 MIN: Performed by: PODIATRIST

## 2021-07-02 PROCEDURE — 6370000000 HC RX 637 (ALT 250 FOR IP): Performed by: ANESTHESIOLOGY

## 2021-07-02 PROCEDURE — 88300 SURGICAL PATH GROSS: CPT

## 2021-07-02 PROCEDURE — C1889 IMPLANT/INSERT DEVICE, NOC: HCPCS | Performed by: PODIATRIST

## 2021-07-02 PROCEDURE — 2580000003 HC RX 258: Performed by: STUDENT IN AN ORGANIZED HEALTH CARE EDUCATION/TRAINING PROGRAM

## 2021-07-02 PROCEDURE — 3700000000 HC ANESTHESIA ATTENDED CARE: Performed by: PODIATRIST

## 2021-07-02 PROCEDURE — 88304 TISSUE EXAM BY PATHOLOGIST: CPT

## 2021-07-02 PROCEDURE — 3209999900 FLUORO FOR SURGICAL PROCEDURES

## 2021-07-02 PROCEDURE — 6360000002 HC RX W HCPCS

## 2021-07-02 PROCEDURE — 7100000010 HC PHASE II RECOVERY - FIRST 15 MIN: Performed by: PODIATRIST

## 2021-07-02 PROCEDURE — 2500000003 HC RX 250 WO HCPCS: Performed by: PODIATRIST

## 2021-07-02 PROCEDURE — 7100000001 HC PACU RECOVERY - ADDTL 15 MIN: Performed by: PODIATRIST

## 2021-07-02 PROCEDURE — 3600000003 HC SURGERY LEVEL 3 BASE: Performed by: PODIATRIST

## 2021-07-02 PROCEDURE — 82962 GLUCOSE BLOOD TEST: CPT

## 2021-07-02 PROCEDURE — 6360000002 HC RX W HCPCS: Performed by: STUDENT IN AN ORGANIZED HEALTH CARE EDUCATION/TRAINING PROGRAM

## 2021-07-02 DEVICE — GRAFT HUM TISS W35XL35MM THK15MM ACELLULAR DERM RM TEMP: Type: IMPLANTABLE DEVICE | Site: FOOT | Status: FUNCTIONAL

## 2021-07-02 RX ORDER — SODIUM CHLORIDE 0.9 % (FLUSH) 0.9 %
5-40 SYRINGE (ML) INJECTION EVERY 12 HOURS SCHEDULED
Status: DISCONTINUED | OUTPATIENT
Start: 2021-07-02 | End: 2021-07-02 | Stop reason: HOSPADM

## 2021-07-02 RX ORDER — TRAMADOL HYDROCHLORIDE 50 MG/1
50 TABLET ORAL
Status: COMPLETED | OUTPATIENT
Start: 2021-07-02 | End: 2021-07-02

## 2021-07-02 RX ORDER — TRAMADOL HYDROCHLORIDE 50 MG/1
TABLET ORAL
Status: DISCONTINUED
Start: 2021-07-02 | End: 2021-07-02 | Stop reason: HOSPADM

## 2021-07-02 RX ORDER — SODIUM CHLORIDE 0.9 % (FLUSH) 0.9 %
5-40 SYRINGE (ML) INJECTION PRN
Status: DISCONTINUED | OUTPATIENT
Start: 2021-07-02 | End: 2021-07-02 | Stop reason: HOSPADM

## 2021-07-02 RX ORDER — BUPIVACAINE HYDROCHLORIDE 5 MG/ML
INJECTION, SOLUTION EPIDURAL; INTRACAUDAL PRN
Status: DISCONTINUED | OUTPATIENT
Start: 2021-07-02 | End: 2021-07-02 | Stop reason: ALTCHOICE

## 2021-07-02 RX ORDER — LABETALOL HYDROCHLORIDE 5 MG/ML
INJECTION, SOLUTION INTRAVENOUS PRN
Status: DISCONTINUED | OUTPATIENT
Start: 2021-07-02 | End: 2021-07-02 | Stop reason: SDUPTHER

## 2021-07-02 RX ORDER — NEOSTIGMINE METHYLSULFATE 1 MG/ML
INJECTION, SOLUTION INTRAVENOUS PRN
Status: DISCONTINUED | OUTPATIENT
Start: 2021-07-02 | End: 2021-07-02 | Stop reason: SDUPTHER

## 2021-07-02 RX ORDER — ONDANSETRON 2 MG/ML
4 INJECTION INTRAMUSCULAR; INTRAVENOUS
Status: DISCONTINUED | OUTPATIENT
Start: 2021-07-02 | End: 2021-07-02 | Stop reason: HOSPADM

## 2021-07-02 RX ORDER — HYDRALAZINE HYDROCHLORIDE 20 MG/ML
5 INJECTION INTRAMUSCULAR; INTRAVENOUS ONCE
Status: COMPLETED | OUTPATIENT
Start: 2021-07-02 | End: 2021-07-02

## 2021-07-02 RX ORDER — FENTANYL CITRATE 50 UG/ML
25 INJECTION, SOLUTION INTRAMUSCULAR; INTRAVENOUS EVERY 5 MIN PRN
Status: DISCONTINUED | OUTPATIENT
Start: 2021-07-02 | End: 2021-07-02 | Stop reason: HOSPADM

## 2021-07-02 RX ORDER — PROPOFOL 10 MG/ML
INJECTION, EMULSION INTRAVENOUS PRN
Status: DISCONTINUED | OUTPATIENT
Start: 2021-07-02 | End: 2021-07-02 | Stop reason: SDUPTHER

## 2021-07-02 RX ORDER — DEXAMETHASONE SODIUM PHOSPHATE 4 MG/ML
INJECTION, SOLUTION INTRA-ARTICULAR; INTRALESIONAL; INTRAMUSCULAR; INTRAVENOUS; SOFT TISSUE PRN
Status: DISCONTINUED | OUTPATIENT
Start: 2021-07-02 | End: 2021-07-02 | Stop reason: SDUPTHER

## 2021-07-02 RX ORDER — RIVAROXABAN 10 MG/1
10 TABLET, FILM COATED ORAL
Qty: 30 TABLET | Refills: 1 | Status: SHIPPED | OUTPATIENT
Start: 2021-07-02

## 2021-07-02 RX ORDER — MIDAZOLAM HYDROCHLORIDE 1 MG/ML
INJECTION INTRAMUSCULAR; INTRAVENOUS PRN
Status: DISCONTINUED | OUTPATIENT
Start: 2021-07-02 | End: 2021-07-02 | Stop reason: SDUPTHER

## 2021-07-02 RX ORDER — ROCURONIUM BROMIDE 10 MG/ML
INJECTION, SOLUTION INTRAVENOUS PRN
Status: DISCONTINUED | OUTPATIENT
Start: 2021-07-02 | End: 2021-07-02 | Stop reason: SDUPTHER

## 2021-07-02 RX ORDER — MEPERIDINE HYDROCHLORIDE 25 MG/ML
12.5 INJECTION INTRAMUSCULAR; INTRAVENOUS; SUBCUTANEOUS EVERY 5 MIN PRN
Status: DISCONTINUED | OUTPATIENT
Start: 2021-07-02 | End: 2021-07-02 | Stop reason: HOSPADM

## 2021-07-02 RX ORDER — KETAMINE HYDROCHLORIDE 10 MG/ML
INJECTION, SOLUTION INTRAMUSCULAR; INTRAVENOUS PRN
Status: DISCONTINUED | OUTPATIENT
Start: 2021-07-02 | End: 2021-07-02 | Stop reason: SDUPTHER

## 2021-07-02 RX ORDER — FENTANYL CITRATE 50 UG/ML
INJECTION, SOLUTION INTRAMUSCULAR; INTRAVENOUS PRN
Status: DISCONTINUED | OUTPATIENT
Start: 2021-07-02 | End: 2021-07-02 | Stop reason: SDUPTHER

## 2021-07-02 RX ORDER — HYDRALAZINE HYDROCHLORIDE 20 MG/ML
INJECTION INTRAMUSCULAR; INTRAVENOUS
Status: DISCONTINUED
Start: 2021-07-02 | End: 2021-07-02 | Stop reason: HOSPADM

## 2021-07-02 RX ORDER — SODIUM CHLORIDE 9 MG/ML
25 INJECTION, SOLUTION INTRAVENOUS PRN
Status: DISCONTINUED | OUTPATIENT
Start: 2021-07-02 | End: 2021-07-02 | Stop reason: HOSPADM

## 2021-07-02 RX ORDER — FENTANYL CITRATE 50 UG/ML
50 INJECTION, SOLUTION INTRAMUSCULAR; INTRAVENOUS EVERY 5 MIN PRN
Status: DISCONTINUED | OUTPATIENT
Start: 2021-07-02 | End: 2021-07-02 | Stop reason: HOSPADM

## 2021-07-02 RX ORDER — ONDANSETRON 2 MG/ML
INJECTION INTRAMUSCULAR; INTRAVENOUS PRN
Status: DISCONTINUED | OUTPATIENT
Start: 2021-07-02 | End: 2021-07-02 | Stop reason: SDUPTHER

## 2021-07-02 RX ORDER — LIDOCAINE HYDROCHLORIDE 20 MG/ML
INJECTION, SOLUTION INFILTRATION; PERINEURAL PRN
Status: DISCONTINUED | OUTPATIENT
Start: 2021-07-02 | End: 2021-07-02 | Stop reason: SDUPTHER

## 2021-07-02 RX ORDER — DOXYCYCLINE HYCLATE 100 MG
100 TABLET ORAL 2 TIMES DAILY
Qty: 28 TABLET | Refills: 1 | Status: SHIPPED | OUTPATIENT
Start: 2021-07-02 | End: 2021-07-16

## 2021-07-02 RX ORDER — TRAMADOL HYDROCHLORIDE 50 MG/1
50 TABLET ORAL EVERY 6 HOURS PRN
Qty: 12 TABLET | Refills: 0 | Status: SHIPPED | OUTPATIENT
Start: 2021-07-02 | End: 2021-07-30

## 2021-07-02 RX ORDER — GLYCOPYRROLATE 1 MG/5 ML
SYRINGE (ML) INTRAVENOUS PRN
Status: DISCONTINUED | OUTPATIENT
Start: 2021-07-02 | End: 2021-07-02 | Stop reason: SDUPTHER

## 2021-07-02 RX ADMIN — MIDAZOLAM 2 MG: 1 INJECTION INTRAMUSCULAR; INTRAVENOUS at 11:31

## 2021-07-02 RX ADMIN — PROPOFOL 30 MG: 10 INJECTION, EMULSION INTRAVENOUS at 13:11

## 2021-07-02 RX ADMIN — LIDOCAINE HYDROCHLORIDE 60 MG: 20 INJECTION, SOLUTION INFILTRATION; PERINEURAL at 11:41

## 2021-07-02 RX ADMIN — Medication 3 MG: at 13:20

## 2021-07-02 RX ADMIN — LABETALOL HYDROCHLORIDE 5 MG: 5 INJECTION INTRAVENOUS at 13:34

## 2021-07-02 RX ADMIN — DEXAMETHASONE SODIUM PHOSPHATE 10 MG: 4 INJECTION, SOLUTION INTRAMUSCULAR; INTRAVENOUS at 11:53

## 2021-07-02 RX ADMIN — ONDANSETRON 4 MG: 2 INJECTION INTRAMUSCULAR; INTRAVENOUS at 13:14

## 2021-07-02 RX ADMIN — FENTANYL CITRATE 75 MCG: 50 INJECTION, SOLUTION INTRAMUSCULAR; INTRAVENOUS at 11:41

## 2021-07-02 RX ADMIN — TRAMADOL HYDROCHLORIDE 50 MG: 50 TABLET, FILM COATED ORAL at 15:00

## 2021-07-02 RX ADMIN — FENTANYL CITRATE 50 MCG: 50 INJECTION INTRAMUSCULAR; INTRAVENOUS at 13:40

## 2021-07-02 RX ADMIN — Medication 0.6 MG: at 13:20

## 2021-07-02 RX ADMIN — ROCURONIUM BROMIDE 50 MG: 10 INJECTION, SOLUTION INTRAVENOUS at 11:41

## 2021-07-02 RX ADMIN — PROPOFOL 50 MG: 10 INJECTION, EMULSION INTRAVENOUS at 12:51

## 2021-07-02 RX ADMIN — KETAMINE HYDROCHLORIDE 38 MG: 10 INJECTION INTRAMUSCULAR; INTRAVENOUS at 13:11

## 2021-07-02 RX ADMIN — FENTANYL CITRATE 50 MCG: 50 INJECTION INTRAMUSCULAR; INTRAVENOUS at 13:49

## 2021-07-02 RX ADMIN — PROPOFOL 200 MG: 10 INJECTION, EMULSION INTRAVENOUS at 11:41

## 2021-07-02 RX ADMIN — LABETALOL HYDROCHLORIDE 2 MG: 5 INJECTION INTRAVENOUS at 12:06

## 2021-07-02 RX ADMIN — Medication 2000 MG: at 11:50

## 2021-07-02 RX ADMIN — SODIUM CHLORIDE: 9 INJECTION, SOLUTION INTRAVENOUS at 11:35

## 2021-07-02 RX ADMIN — HYDRALAZINE HYDROCHLORIDE 5 MG: 20 INJECTION INTRAMUSCULAR; INTRAVENOUS at 14:56

## 2021-07-02 RX ADMIN — FENTANYL CITRATE 25 MCG: 50 INJECTION, SOLUTION INTRAMUSCULAR; INTRAVENOUS at 12:20

## 2021-07-02 ASSESSMENT — PULMONARY FUNCTION TESTS
PIF_VALUE: 30
PIF_VALUE: 43
PIF_VALUE: 31
PIF_VALUE: 1
PIF_VALUE: 41
PIF_VALUE: 1
PIF_VALUE: 29
PIF_VALUE: 23
PIF_VALUE: 33
PIF_VALUE: 1
PIF_VALUE: 25
PIF_VALUE: 31
PIF_VALUE: 30
PIF_VALUE: 10
PIF_VALUE: 0
PIF_VALUE: 33
PIF_VALUE: 4
PIF_VALUE: 0
PIF_VALUE: 1
PIF_VALUE: 10
PIF_VALUE: 31
PIF_VALUE: 2
PIF_VALUE: 25
PIF_VALUE: 0
PIF_VALUE: 28
PIF_VALUE: 30
PIF_VALUE: 0
PIF_VALUE: 31
PIF_VALUE: 30
PIF_VALUE: 2
PIF_VALUE: 31
PIF_VALUE: 31
PIF_VALUE: 0
PIF_VALUE: 25
PIF_VALUE: 1
PIF_VALUE: 11
PIF_VALUE: 33
PIF_VALUE: 30
PIF_VALUE: 1
PIF_VALUE: 10
PIF_VALUE: 30
PIF_VALUE: 13
PIF_VALUE: 30
PIF_VALUE: 25
PIF_VALUE: 33
PIF_VALUE: 3
PIF_VALUE: 25
PIF_VALUE: 31
PIF_VALUE: 0
PIF_VALUE: 32
PIF_VALUE: 21
PIF_VALUE: 30
PIF_VALUE: 30
PIF_VALUE: 31
PIF_VALUE: 1
PIF_VALUE: 30
PIF_VALUE: 22
PIF_VALUE: 31
PIF_VALUE: 31
PIF_VALUE: 1
PIF_VALUE: 1
PIF_VALUE: 25
PIF_VALUE: 30
PIF_VALUE: 9
PIF_VALUE: 31
PIF_VALUE: 25
PIF_VALUE: 0
PIF_VALUE: 1
PIF_VALUE: 25
PIF_VALUE: 44
PIF_VALUE: 30
PIF_VALUE: 0
PIF_VALUE: 19
PIF_VALUE: 1
PIF_VALUE: 5
PIF_VALUE: 29
PIF_VALUE: 1
PIF_VALUE: 1
PIF_VALUE: 10
PIF_VALUE: 30
PIF_VALUE: 33
PIF_VALUE: 30
PIF_VALUE: 10
PIF_VALUE: 31
PIF_VALUE: 31
PIF_VALUE: 30
PIF_VALUE: 1
PIF_VALUE: 42
PIF_VALUE: 31
PIF_VALUE: 30
PIF_VALUE: 25
PIF_VALUE: 1
PIF_VALUE: 31
PIF_VALUE: 10
PIF_VALUE: 25
PIF_VALUE: 32
PIF_VALUE: 5
PIF_VALUE: 35
PIF_VALUE: 23
PIF_VALUE: 23
PIF_VALUE: 25
PIF_VALUE: 1
PIF_VALUE: 10
PIF_VALUE: 0
PIF_VALUE: 3
PIF_VALUE: 25
PIF_VALUE: 31
PIF_VALUE: 25
PIF_VALUE: 28
PIF_VALUE: 25
PIF_VALUE: 1
PIF_VALUE: 1
PIF_VALUE: 0
PIF_VALUE: 10
PIF_VALUE: 25
PIF_VALUE: 0
PIF_VALUE: 30
PIF_VALUE: 28
PIF_VALUE: 32
PIF_VALUE: 32
PIF_VALUE: 0
PIF_VALUE: 10
PIF_VALUE: 25

## 2021-07-02 ASSESSMENT — PAIN DESCRIPTION - PROGRESSION
CLINICAL_PROGRESSION: GRADUALLY IMPROVING
CLINICAL_PROGRESSION: GRADUALLY IMPROVING
CLINICAL_PROGRESSION: RAPIDLY WORSENING

## 2021-07-02 ASSESSMENT — PAIN SCALES - GENERAL
PAINLEVEL_OUTOF10: 7
PAINLEVEL_OUTOF10: 3
PAINLEVEL_OUTOF10: 7
PAINLEVEL_OUTOF10: 4
PAINLEVEL_OUTOF10: 8

## 2021-07-02 ASSESSMENT — PAIN - FUNCTIONAL ASSESSMENT: PAIN_FUNCTIONAL_ASSESSMENT: 0-10

## 2021-07-02 ASSESSMENT — PAIN DESCRIPTION - ORIENTATION: ORIENTATION: LEFT

## 2021-07-02 ASSESSMENT — PAIN DESCRIPTION - LOCATION: LOCATION: FOOT

## 2021-07-02 ASSESSMENT — PAIN DESCRIPTION - PAIN TYPE
TYPE: SURGICAL PAIN

## 2021-07-02 ASSESSMENT — PAIN DESCRIPTION - DESCRIPTORS: DESCRIPTORS: ACHING;DISCOMFORT

## 2021-07-02 NOTE — ANESTHESIA POSTPROCEDURE EVALUATION
Department of Anesthesiology  Postprocedure Note    Patient: Jordy Aburto  MRN: [id number]  YOB: 1950  Date of evaluation: 7/2/2021  Time:  1:37 PM     Procedure Summary     Date: 07/02/21 Room / Location: [facility] 05 / [address] [place] [address]    Anesthesia Start: 1137 Anesthesia Stop:     Procedure: REPAIR OF TIBIALIS  ANTERIOR TENDON HARDWARE REMOVAL OF LEFT FOOT (Left Foot) Diagnosis: (TIBIALIS ANTERIOR TENDON RUPTURE AND PAINFUL HARDWARE)    Surgeons: Jeanine Maher DPM Responsible Provider: Ray Rojo MD    Anesthesia Type: general, MAC ASA Status: 3          Anesthesia Type: No value filed. Fredo Phase I: Fredo Score: 10    Fredo Phase II:      Last vitals: Reviewed and per EMR flowsheets.        Anesthesia Post Evaluation    Patient location during evaluation: PACU  Patient participation: complete - patient participated  Level of consciousness: awake  Pain score: 3  Nausea & Vomiting: no nausea  Complications: no  Cardiovascular status: blood pressure returned to baseline  Respiratory status: acceptable  Hydration status: euvolemic

## 2021-07-02 NOTE — ANESTHESIA PRE PROCEDURE
Department of Anesthesiology  Preprocedure Note       Name:  Funmi Cameron   Age:  65 y.o.  :  1950                                          MRN:  [id number]         Date:  2021      Surgeon: Katerin Crow):  Blayne Arita DPM    Procedure: Procedure(s):  REPAIR OF TIBIALIS  ANTERIOR TENDON HARDWARE REMOVAL OF LEFT FOOT   ++[id number]    Medications prior to admission:   Prior to Admission medications    Medication Sig Start Date End Date Taking?  Authorizing Provider   montelukast (SINGULAIR) 5 MG chewable tablet Take 5 mg by mouth daily   Yes Historical Provider, MD   OMEPRAZOLE PO Take by mouth daily   Yes Historical Provider, MD   rosuvastatin (CRESTOR) 5 MG tablet Take 5 mg by mouth nightly  21  Yes Historical Provider, MD   Ascorbic Acid (VITAMIN C PO) Take 1 tablet by mouth daily LD    Yes Historical Provider, MD   acetaminophen (TYLENOL) 500 MG tablet Take 500 mg by mouth 3 times daily   Yes Historical Provider, MD   NONFORMULARY Take 2 capsules by mouth daily Sulfurzyne, organic form of sulfer 1.8 G  Ld    Yes Historical Provider, MD   losartan (COZAAR) 100 MG tablet Take 100 mg by mouth daily   Yes Historical Provider, MD   metFORMIN (GLUCOPHAGE) 500 MG tablet Take 500 mg by mouth Daily with supper    Yes Historical Provider, MD   DULoxetine (CYMBALTA) 60 MG extended release capsule Take 60 mg by mouth nightly   Yes Historical Provider, MD   Ergocalciferol (VITAMIN D2 PO) Take by mouth Twice a Week Ld    Yes Historical Provider, MD   Omega-3 Fatty Acids (OMEGA 3 PO) Take by mouth daily Instructed to hold 5 days pre-op   Ld    Yes Historical Provider, MD       Current medications:    Current Facility-Administered Medications   Medication Dose Route Frequency Provider Last Rate Last Admin    sodium chloride flush 0.9 % injection 5-40 mL  5-40 mL Intravenous 2 times per day Willian Lora DPM        sodium chloride flush 0.9 % injection 5-40 mL  5-40 mL Intravenous PRN Crystal Aquino, DPM        0.9 % sodium chloride infusion  25 mL Intravenous PRN Crystal Aquino, DPM        ceFAZolin (ANCEF) 2000 mg in sterile water 20 mL IV syringe  2,000 mg Intravenous [facility]           Allergies:  No Known Allergies    Problem List:  There is no problem list on file for this patient. Past Medical History:        Diagnosis Date    Acid reflux disease     Diabetes mellitus ([address]     Difficulty walking     wears boot left foot    Fibromyalgia     Hammer toe of left foot     2nd & 3rd toe (OR 1-11-21))    Hyperlipidemia     Hypertension     CHARLIE on CPAP        Past Surgical History:        Procedure Laterality Date    ARTHRODESIS Left 1/11/2021    LEFT LAPIDUS BUNIONECTOMY,  MIDFOOT FUSION, EXTENSOR TENDON TRANSFER SECOND AND THIRD, REDUCTION OF SECOND METATARSAL JOINT, FLEXOR TENDON TRANSFER 2 & 3, CAPSULOTOMY 2 & 3 LEFT FOOT performed by Sandra Pak DPM at [address]  2015    EYE SURGERY Bilateral     cataract    GASTROCNEMIUS RECESSION Left 1/11/2021    ENDOSCOPIC GASTROCNEMIUS RECESSION,  HARVEST BONE GRAFT performed by Sandra Pak DPM at [facility] Right     partial knee       Social History:    Social History     Tobacco Use    Smoking status: Never Smoker    Smokeless tobacco: Never Used   Substance Use Topics    Alcohol use: Not Currently                                Counseling given: Not Answered      Vital Signs (Current):   Vitals:    06/30/21 1237   Weight: 219 lb (99.3 kg)   Height: 5' 1\" (1.549 m)                                              BP Readings from Last 3 Encounters:   01/11/21 (!) 163/83   01/11/21 (!) 167/77       NPO Status:                                                                                 BMI:   Wt Readings from Last 3 Encounters:   06/30/21 219 lb (99.3 kg)   01/11/21 210 lb (95.3 kg)     Body mass index is 41.38 kg/m².     CBC: No results found for: WBC, RBC, HGB, HCT, MCV, RDW, PLT    CMP: No results found for: NA, K, CL, CO2, BUN, CREATININE, GFRAA, AGRATIO, LABGLOM, GLUCOSE, PROT, CALCIUM, BILITOT, ALKPHOS, AST, ALT    POC Tests: No results for input(s): POCGLU, POCNA, POCK, POCCL, POCBUN, POCHEMO, POCHCT in the last 72 hours. Coags: No results found for: PROTIME, INR, APTT    HCG (If Applicable): No results found for: PREGTESTUR, PREGSERUM, HCG, HCGQUANT     ABGs: No results found for: PHART, PO2ART, KXE1KVX, HLK3NRZ, BEART, A1HPJBLM     Type & Screen (If Applicable):  No results found for: LABABO, LABRH    Drug/Infectious Status (If Applicable):  No results found for: HIV, HEPCAB    COVID-19 Screening (If Applicable):   Lab Results   Component Value Date    COVID19 Not Detected 01/05/2021           Anesthesia Evaluation  Patient summary reviewed  Airway: Mallampati: II     Neck ROM: full  Mouth opening: > = 3 FB Dental: normal exam         Pulmonary:normal exam    (+) sleep apnea: on CPAP,                             Cardiovascular:    (+) hypertension:, hyperlipidemia        Rhythm: regular  Rate: normal           Beta Blocker:  Not on Beta Blocker         Neuro/Psych:   (+) neuromuscular disease:,             GI/Hepatic/Renal:   (+) GERD: well controlled,           Endo/Other:    (+) DiabetesType II DM, well controlled, , .                 Abdominal:       Abdomen: soft. Vascular: Other Findings: implants            Anesthesia Plan      general and MAC     ASA 3       Induction: intravenous. Anesthetic plan and risks discussed with patient. Plan discussed with CRNA. pt seen questions answered pt examined H&P reviewed no interim change accepts.         Duane Rakers, MD   7/2/2021

## 2021-07-02 NOTE — PROGRESS NOTES
Discharge instructions provided to patient and daughter via telephone. Verbalized understanding. Daughter unable to obtain a wheel chair at this time. Will discharge with crutches. Patient demonstrated use of crutches non wt bearing to left leg. Had some degree of difficulty. Residents notified and ok to discharge.

## 2021-07-02 NOTE — ANESTHESIA PRE PROCEDURE
Department of Anesthesiology  Preprocedure Note       Name:  Yoselin Kaur   Age:  65 y.o.  :  1950                                          MRN:  [id number]         Date:  2021      Surgeon: Tutu Lewis):  Juma Jordan DPM    Procedure: Procedure(s):  REPAIR OF TIBIALIS  ANTERIOR TENDON HARDWARE REMOVAL OF LEFT FOOT   ++[id number]    Medications prior to admission:   Prior to Admission medications    Medication Sig Start Date End Date Taking?  Authorizing Provider   montelukast (SINGULAIR) 5 MG chewable tablet Take 5 mg by mouth daily   Yes Historical Provider, MD   OMEPRAZOLE PO Take by mouth daily   Yes Historical Provider, MD   rosuvastatin (CRESTOR) 5 MG tablet Take 5 mg by mouth nightly  21  Yes Historical Provider, MD   Ascorbic Acid (VITAMIN C PO) Take 1 tablet by mouth daily LD    Yes Historical Provider, MD   acetaminophen (TYLENOL) 500 MG tablet Take 500 mg by mouth 3 times daily   Yes Historical Provider, MD   NONFORMULARY Take 2 capsules by mouth daily Sulfurzyne, organic form of sulfer 1.8 G  Ld    Yes Historical Provider, MD   losartan (COZAAR) 100 MG tablet Take 100 mg by mouth daily   Yes Historical Provider, MD   metFORMIN (GLUCOPHAGE) 500 MG tablet Take 500 mg by mouth Daily with supper    Yes Historical Provider, MD   DULoxetine (CYMBALTA) 60 MG extended release capsule Take 60 mg by mouth nightly   Yes Historical Provider, MD   Ergocalciferol (VITAMIN D2 PO) Take by mouth Twice a Week Ld    Yes Historical Provider, MD   Omega-3 Fatty Acids (OMEGA 3 PO) Take by mouth daily Instructed to hold 5 days pre-op   Ld    Yes Historical Provider, MD       Current medications:    Current Facility-Administered Medications   Medication Dose Route Frequency Provider Last Rate Last Admin    sodium chloride flush 0.9 % injection 5-40 mL  5-40 mL Intravenous 2 times per day Princess Denis DPM        sodium chloride flush 0.9 % injection 5-40 mL  5-40 mL Intravenous PRN Vy Blankenship, LUIS ALBERTOM        0.9 % sodium chloride infusion  25 mL Intravenous PRN Vy Yamilaa, DPM        ceFAZolin (ANCEF) 2000 mg in sterile water 20 mL IV syringe  2,000 mg Intravenous On Call to [facility] [place]           Allergies:  No Known Allergies    Problem List:  There is no problem list on file for this patient. Past Medical History:        Diagnosis Date    Acid reflux disease     Diabetes mellitus ([address]     Difficulty walking     wears boot left foot    Fibromyalgia     Hammer toe of left foot     2nd & 3rd toe (OR 1-11-21))    Hyperlipidemia     Hypertension     CHARLIE on CPAP        Past Surgical History:        Procedure Laterality Date    ARTHRODESIS Left 1/11/2021    LEFT LAPIDUS BUNIONECTOMY,  MIDFOOT FUSION, EXTENSOR TENDON TRANSFER SECOND AND THIRD, REDUCTION OF SECOND METATARSAL JOINT, FLEXOR TENDON TRANSFER 2 & 3, CAPSULOTOMY 2 & 3 LEFT FOOT performed by Percy Bills DPM at [address]  2015    EYE SURGERY Bilateral     cataract    GASTROCNEMIUS RECESSION Left 1/11/2021    ENDOSCOPIC GASTROCNEMIUS RECESSION,  HARVEST BONE GRAFT performed by Percy Bills DPM at [address] Right     partial knee       Social History:    Social History     Tobacco Use    Smoking status: Never Smoker    Smokeless tobacco: Never Used   Substance Use Topics    Alcohol use: Not Currently                                Counseling given: Not Answered      Vital Signs (Current):   Vitals:    06/30/21 1237   Weight: 219 lb (99.3 kg)   Height: 5' 1\" (1.549 m)                                              BP Readings from Last 3 Encounters:   01/11/21 (!) 163/83   01/11/21 (!) 167/77       NPO Status:                                                                                 BMI:   Wt Readings from Last 3 Encounters:   06/30/21 219 lb (99.3 kg)   01/11/21 210 lb (95.3 kg)     Body mass index is 41.38 kg/m².     CBC: No results found for: WBC, RBC, HGB, HCT, MCV, RDW, PLT    CMP: No results found for: NA, K, CL, CO2, BUN, CREATININE, GFRAA, AGRATIO, LABGLOM, GLUCOSE, PROT, CALCIUM, BILITOT, ALKPHOS, AST, ALT    POC Tests: No results for input(s): POCGLU, POCNA, POCK, POCCL, POCBUN, POCHEMO, POCHCT in the last 72 hours.     Coags: No results found for: PROTIME, INR, APTT    HCG (If Applicable): No results found for: PREGTESTUR, PREGSERUM, HCG, HCGQUANT     ABGs: No results found for: PHART, PO2ART, IDE0RTM, AAO7HMQ, BEART, C2FFNXFI     Type & Screen (If Applicable):  No results found for: LABABO, LABRH    Drug/Infectious Status (If Applicable):  No results found for: HIV, HEPCAB    COVID-19 Screening (If Applicable):   Lab Results   Component Value Date    COVID19 Not Detected 01/05/2021           Anesthesia Evaluation     Anesthesia Plan    Pt seen H&P reviewed no interim changes pt examined accepts    Antionette Shelton MD   7/2/2021

## 2021-07-02 NOTE — PROGRESS NOTES
Spoke with daughter. Unable to obtain a wheel chair or scooter. Voiced concern about not being able to ambulate patient at all. Resident notified. Will see patient.

## 2021-07-03 NOTE — OP NOTE
[id number] [address]                                OPERATIVE REPORT    PATIENT NAME: Janee Monahan                   :        1950  MED REC NO:   [id number]                            ROOM:  ACCOUNT NO:   [de-identified]                           ADMIT DATE: 2021  PROVIDER:     Darlin Gordon DPM    DATE OF PROCEDURE:  2021    INDICATION:  The patient was seen with an anterior tibial tendon rupture  of her left. She had surgery by myself done several months prior to her  injury and subsequently, she experienced an anterior tibial tendon  rupture. The rupture via MRI was suggested to be approximatly 9 cm proximal retraction. At this time, she  is set up for surgery at [address] [place] [address] on  understanding the  pros, cons, risks, and benefits of overcorrection, undercorrection,  recurrence, numbness, infection, worsening of it, limb loss, DVT, PE,  and that anything can happen, nothing should happen. With this in mind,  she understands consent, site marking, and perioperative management, and  agreed to them. LOWER EXTREMITY PHYSICAL EXAMINATION:  VASCULAR:  She has intact pedal pulses 2/4 DP and PT. Good capillary  refill time. NEUROLOGIC:  Has not changed. DERMATOLOGIC:  No open wound at the region. Skin turgor is intact. MUSCULOSKELETAL:  She has lack of dorsiflexion. She has a tear  consistent with tearing of the anterior tibial tendon of the left. ORTHOPEDIC ASSESSMENT:  No gross deformities. She does have some  painful hardware noted from her previous bunion surgery. We do not  think it is related to the hardware. SURGEON:  Darlin Gordon DPM    ASSISTANTS:  1.  Dr. Vidya Almaraz, Fellow. 2.  Dr. Flores Jeter, PGY-3.    PREOPERATIVE DIAGNOSES:  1. Anterior tibial tendon rupture, left. 2.  Painful hardware, left. POSTOPERATIVE DIAGNOSES:  1.   Anterior tibial tendon rupture, left. 2.  Painful hardware, left. PROCEDURES PERFORMED:  1. Removal of painful hardware. 2.  Repair of the anterior tibial tendon. 3.  Harvesting graft for repair of anterior tibial tendon of left ankle. PROCEDURE IN DETAIL:  The patient was seen in the preop holding area. Appropriate site marking was performed. She agreed to the consent, site  marking, and perioperative management. She was brought into the OR and  placed well padded on the OR table where anesthesia was achieved. Once  the anesthesia was achieved, appropriate timeout was performed and  everybody in the room concurred. Procedure Number 1:  REMOVAL OF PAINFUL HARDWARE. An incision was made over a previous incision. It was deepened in the same plane using  sharp and blunt dissection avoiding neurovascular structures. At this  time, her hardware was removed. We backed out a \"home run screw\" was  felt intact as well as intramedullary. Under fluoroscopy, there was  noted to be dorsal contracture of the fourth toe, which was slightly  elevated compared to her lesser toes of 1, 2, 3, and 5. This area was  flushed with copious amounts of sterile normal saline. Deep tissues  were closed using 0 Vicryl and skin was closed using 2-0 nylon. Procedure Number 2:  REPAIR OF ANTERIOR TIBIAL TENDON. Next, attention was directed to the anterior tibial tendon where an incision was made at  the base of the cuneiform, proximal.  It was deepened in the same plane  using sharp and blunt dissection avoiding neurovascular structures. At  this time, the insertion of the tendon was consistent with good healthy  tendon that was well maintained and no detachment was noted. At this  time, 6 cm proximally, there was noted an area of significant fibrosis,  scar tissue, calcifications, significant with the diseased anterior  tibial tendon of the left ankle.   This was measured about 3.5 cm in  length of diseased tendon by clinical palpation of the field. Proximally, the tendon was smooth, healthy, as it was distally. The  incision was extended proximally and was measured for harvest of the  anterior tibial tendon. Procedure Number 3:  HARVESTING OF ANTERIOR TIBIAL TENDON. The proximal healthy portion of the anterior tibial tendon, approximately one-half of  the tendon was harvested. Then, this was sutured proximally and sutured  distally. With suturing, we got some tearing of the tendon. Then,  under physiological tension with the ankle at 90 degrees, a distal  portion of the harvested tendon in attachment to the proximal portion of  the healthy tendon where it was put under physiological tension inserted  through distal from the remaining healthy tendon with a V-graft. This  was all sutured in very snuggly with 0 Vicryl. Then, the remaining  stump of the V-graft was sutured back into the periosteum of the bone. We have a secured and excellent fixation of soft tissue of the left  ankle. Additionally, a dermal allograft from [employer] was used to  reinforce and enhance the fixation of the soft tissues at the torn  tendon of the harvest autogenous graft. The area was flushed with copious amounts of sterile normal saline. Deep tissues were closed  using 0 Vicryl and skin was closed using 2-0 nylon after she was  anesthetized with 30 mL of 0.5% Marcaine about the surgical site  proximal.      The surgical wounds were dressed with Betadine-soaked  Adaptic, 4x4s, and Luke in a sterile compressive fashion. An Univalve  B-K cast and a posterior splint were applied to her left lower  extremity. She tolerated the procedure and anesthesia well and left the  OR with vital signs stable and neurovascular status intact. An estimation of less than 20cc of blood was lost during the surgery.         Freddy Cross DPM    D: 07/02/2021 13:53:11       T: 07/02/2021 15:03:29     [email or web address]  Job#: [id number]     Doc#: [id number]    CC:

## (undated) DEVICE — CHLORAPREP 26ML ORANGE

## (undated) DEVICE — INTENDED FOR TISSUE SEPARATION, AND OTHER PROCEDURES THAT REQUIRE A SHARP SURGICAL BLADE TO PUNCTURE OR CUT.: Brand: BARD-PARKER ® STAINLESS STEEL BLADES

## (undated) DEVICE — Z DISCONTINUED GLOVE SURG SZ 7.5 L12IN FNGR THK13MIL WHT ISOLEX

## (undated) DEVICE — NEEDLE HYPO 22GA L1.5IN BLK POLYPR HUB S STL REG BVL STR

## (undated) DEVICE — DRAPE C ARM W41XL74IN UNIV MOB W RUBBERBAND CLP

## (undated) DEVICE — TUBING SUCT 12FR MAL ALUM SHFT FN CAP VENT UNIV CONN W/ OBT

## (undated) DEVICE — ELECTRODE PT RET AD L9FT HI MOIST COND ADH HYDRGEL CORDED

## (undated) DEVICE — BNDG,ELSTC,MATRIX,STRL,6"X5YD,LF,HOOK&LP: Brand: MEDLINE

## (undated) DEVICE — 4-PORT MANIFOLD: Brand: NEPTUNE 2

## (undated) DEVICE — CRUTCH WLK Y STD ALUM MTL RNG AX PUSH BTTN DBL EXTRUDED

## (undated) DEVICE — SYSTEM TPS ORTHO

## (undated) DEVICE — ANTISEPTIC 16OZ H PEROX 1ST AID ORAL DEBRIDING AGNT

## (undated) DEVICE — DRESSING PETRO W3XL8IN OIL EMUL N ADH GZ KNIT IMPREG CELOS

## (undated) DEVICE — BANDAGE,GAUZE,BULKEE II,4.5"X4.1YD,STRL: Brand: MEDLINE

## (undated) DEVICE — COVER HNDL LT DISP

## (undated) DEVICE — TOWEL,OR,DSP,ST,BLUE,STD,6/PK,12PK/CS: Brand: MEDLINE

## (undated) DEVICE — TRAY DRILL SYSTEM 4 REUSABLE

## (undated) DEVICE — PADDING UNDERCAST W6INXL4YD WYTEX 6 PER BG

## (undated) DEVICE — SPONGE LAP W18XL18IN WHT COT 4 PLY FLD STRUNG RADPQ DISP ST

## (undated) DEVICE — SYRINGE MED 30ML STD CLR PLAS LUERLOCK TIP N CTRL DISP

## (undated) DEVICE — SPECIMEN TRAP

## (undated) DEVICE — DRAPE,EXTREMITY,89X128,STERILE: Brand: MEDLINE

## (undated) DEVICE — STOCKINETTE,DOUBLE PLY,6X48,STERILE: Brand: MEDLINE

## (undated) DEVICE — GAUZE,SPONGE,4"X4",8PLY,STRL,LF,10/TRAY: Brand: MEDLINE

## (undated) DEVICE — TRAY SET DIDOMENICO EXTRAS REUSABLE

## (undated) DEVICE — BUR SURG L70MM HD L8MM DIA4MM STR SHANK 235MM 8 FLUT MIC

## (undated) DEVICE — SET ORTHO STD STORTSTD1

## (undated) DEVICE — BANDAGE COMPR W6INXL10YD ST M E WHITE/BEIGE

## (undated) DEVICE — 3M™ COBAN™ NL STERILE NON-LATEX SELF-ADHERENT WRAP, 2084S, 4 IN X 5 YD (10 CM X 4,5 M), 18 ROLLS/CASE: Brand: 3M™ COBAN™

## (undated) DEVICE — SET LAMBOTTE

## (undated) DEVICE — MARKER,SKIN,WI/RULER AND LABELS: Brand: MEDLINE

## (undated) DEVICE — PADDING CAST W6INXL4YD COT LO LINTING WYTEX

## (undated) DEVICE — DOUBLE BASIN SET: Brand: MEDLINE INDUSTRIES, INC.

## (undated) DEVICE — GOWN,SIRUS,FABRNF,XL,20/CS: Brand: MEDLINE

## (undated) DEVICE — BANDAGE COMPR W6INXL12FT SMOOTH FOR LIMB EXSANG ESMARCH

## (undated) DEVICE — IMPLANTABLE DEVICE
Type: IMPLANTABLE DEVICE | Site: FOOT | Status: NON-FUNCTIONAL
Removed: 2021-01-11

## (undated) DEVICE — CFX/AFX STERILE SINGLEUSE INSTRUMENTS,3.5 MM AND 4.0MM SCREW: Brand: IN2BONES

## (undated) DEVICE — INSTRUMENT SYSTEM 4 BATTERY REUSABLE

## (undated) DEVICE — PACK PROCEDURE SURG GEN CUST

## (undated) DEVICE — BNDG,ELSTC,MATRIX,STRL,4"X5YD,LF,HOOK&LP: Brand: MEDLINE

## (undated) DEVICE — PRECISION THIN (9.0 X 0.38 X 25.0MM)

## (undated) DEVICE — PADDING,UNDERCAST,COTTON, 4"X4YD STERILE: Brand: MEDLINE

## (undated) DEVICE — Z INACTIVE USE 2735373 APPLICATOR FBR LAIN COT WOOD TIP ECONOMICAL

## (undated) DEVICE — BANDAGE CAST W6XL180IN PLSTR OF PARIS UNIFORMLY COAT HI DRY

## (undated) DEVICE — ZIMMER® STERILE DISPOSABLE TOURNIQUET CUFF WITH PLC, DUAL PORT, SINGLE BLADDER, 34 IN. (86 CM)

## (undated) DEVICE — TRAY ORTHO1 REUSABLE

## (undated) DEVICE — COVER,TABLE,60X90,STERILE: Brand: MEDLINE

## (undated) DEVICE — HOOK/ TRIANGLE BLADE SET: Brand: ENDOTRAC

## (undated) DEVICE — HOOK/TRIANGLE BLADE KIT: Brand: ENDOTRAC

## (undated) DEVICE — BRUNNS CURRETTES

## (undated) DEVICE — T15 DRIVER, SOLID, AO, STERILE, SINGLE-USE: Brand: IN2BONES

## (undated) DEVICE — Device

## (undated) DEVICE — STERILE PVP: Brand: MEDLINE INDUSTRIES, INC.

## (undated) DEVICE — CAMERA STRYKER 1488 HD GEN

## (undated) DEVICE — SET ARTHROSCOPY INST